# Patient Record
Sex: MALE | Race: BLACK OR AFRICAN AMERICAN | NOT HISPANIC OR LATINO
[De-identification: names, ages, dates, MRNs, and addresses within clinical notes are randomized per-mention and may not be internally consistent; named-entity substitution may affect disease eponyms.]

---

## 2020-10-09 ENCOUNTER — DASHBOARD ENCOUNTERS (OUTPATIENT)
Age: 65
End: 2020-10-09

## 2020-10-09 ENCOUNTER — OFFICE VISIT (OUTPATIENT)
Dept: URBAN - METROPOLITAN AREA CLINIC 84 | Facility: CLINIC | Age: 65
End: 2020-10-09
Payer: MEDICARE

## 2020-10-09 ENCOUNTER — WEB ENCOUNTER (OUTPATIENT)
Dept: URBAN - METROPOLITAN AREA CLINIC 84 | Facility: CLINIC | Age: 65
End: 2020-10-09

## 2020-10-09 DIAGNOSIS — Z12.11 SCREENING COLONOSCOPY: ICD-10-CM

## 2020-10-09 DIAGNOSIS — K43.9 VENTRAL HERNIA: ICD-10-CM

## 2020-10-09 PROCEDURE — 99204 OFFICE O/P NEW MOD 45 MIN: CPT | Performed by: INTERNAL MEDICINE

## 2020-10-09 PROCEDURE — 3017F COLORECTAL CA SCREEN DOC REV: CPT | Performed by: INTERNAL MEDICINE

## 2020-10-09 PROCEDURE — G8483 FLU IMM NO ADMIN DOC REA: HCPCS | Performed by: INTERNAL MEDICINE

## 2020-10-09 PROCEDURE — G9903 PT SCRN TBCO ID AS NON USER: HCPCS | Performed by: INTERNAL MEDICINE

## 2020-10-09 PROCEDURE — G8427 DOCREV CUR MEDS BY ELIG CLIN: HCPCS | Performed by: INTERNAL MEDICINE

## 2020-10-09 PROCEDURE — G8420 CALC BMI NORM PARAMETERS: HCPCS | Performed by: INTERNAL MEDICINE

## 2020-10-09 RX ORDER — POLYETHYLENE GLYOCOL 3350, SODIUM CHLORIDE, SODIUM BICARBONATE AND POTASSIUM CHLORIDE 420; 11.2; 5.72; 1.48 G/4L; G/4L; G/4L; G/4L
AS DIRECTED POWDER, FOR SOLUTION NASOGASTRIC; ORAL ONCE
Qty: 1 BOTTLE | Refills: 0 | OUTPATIENT
Start: 2020-10-09 | End: 2020-10-10

## 2020-10-09 NOTE — PHYSICAL EXAM GASTROINTESTINAL
Abdomen , soft, nontender, nondistended , no guarding or rigidity , no masses palpable , normal bowel sounds , Liver and Spleen , no hepatomegaly present , no hepatosplenomegaly , liver nontender , spleen not palpable.  Small ventral hernia

## 2020-10-09 NOTE — HPI-TODAY'S VISIT:
65 year old man referred by Dr. Bradford with GI complaints.  He feels like he has a "mass" in the top of his abdomen.  He first noticed it when he was doing crunches.  He has no pain or discomfort from it.  Overall he feels well.  He denies anroexia or weight loss.  He denies abdominal pain.  He denies GERD/N/V/dysphagia.  He denies early satiety.  He denies LGI symptoms.  He denies LGI bleed or melena.  His brother had some kind of cancer.  He thinks this was H/N cancer.  He had a KUB a few days ago.  He has not had prior EGD/colonoscopy

## 2020-12-07 ENCOUNTER — OFFICE VISIT (OUTPATIENT)
Dept: URBAN - METROPOLITAN AREA SURGERY CENTER 20 | Facility: SURGERY CENTER | Age: 65
End: 2020-12-07

## 2021-01-29 ENCOUNTER — OFFICE VISIT (OUTPATIENT)
Dept: URBAN - METROPOLITAN AREA SURGERY CENTER 20 | Facility: SURGERY CENTER | Age: 66
End: 2021-01-29
Payer: MEDICARE

## 2021-01-29 ENCOUNTER — CLAIMS CREATED FROM THE CLAIM WINDOW (OUTPATIENT)
Dept: URBAN - METROPOLITAN AREA CLINIC 4 | Facility: CLINIC | Age: 66
End: 2021-01-29
Payer: MEDICARE

## 2021-01-29 DIAGNOSIS — D12.2 ADENOMA OF ASCENDING COLON: ICD-10-CM

## 2021-01-29 DIAGNOSIS — D12.5 ADENOMA OF SIGMOID COLON: ICD-10-CM

## 2021-01-29 DIAGNOSIS — D12.5 BENIGN NEOPLASM OF SIGMOID COLON: ICD-10-CM

## 2021-01-29 DIAGNOSIS — D12.2 BENIGN NEOPLASM OF ASCENDING COLON: ICD-10-CM

## 2021-01-29 DIAGNOSIS — Z12.11 COLON CANCER SCREENING: ICD-10-CM

## 2021-01-29 PROCEDURE — 88305 TISSUE EXAM BY PATHOLOGIST: CPT | Performed by: PATHOLOGY

## 2021-01-29 PROCEDURE — 45385 COLONOSCOPY W/LESION REMOVAL: CPT | Performed by: INTERNAL MEDICINE

## 2021-01-29 PROCEDURE — G8907 PT DOC NO EVENTS ON DISCHARG: HCPCS | Performed by: INTERNAL MEDICINE
